# Patient Record
Sex: FEMALE | Race: WHITE | NOT HISPANIC OR LATINO
[De-identification: names, ages, dates, MRNs, and addresses within clinical notes are randomized per-mention and may not be internally consistent; named-entity substitution may affect disease eponyms.]

---

## 2019-05-20 PROBLEM — Z00.00 ENCOUNTER FOR PREVENTIVE HEALTH EXAMINATION: Status: ACTIVE | Noted: 2019-05-20

## 2019-06-19 ENCOUNTER — APPOINTMENT (OUTPATIENT)
Dept: CARDIOLOGY | Facility: CLINIC | Age: 84
End: 2019-06-19
Payer: MEDICARE

## 2019-06-19 ENCOUNTER — NON-APPOINTMENT (OUTPATIENT)
Age: 84
End: 2019-06-19

## 2019-06-19 VITALS
HEIGHT: 62 IN | BODY MASS INDEX: 31.28 KG/M2 | OXYGEN SATURATION: 95 % | SYSTOLIC BLOOD PRESSURE: 160 MMHG | WEIGHT: 170 LBS | DIASTOLIC BLOOD PRESSURE: 80 MMHG | HEART RATE: 64 BPM

## 2019-06-19 DIAGNOSIS — Z82.49 FAMILY HISTORY OF ISCHEMIC HEART DISEASE AND OTHER DISEASES OF THE CIRCULATORY SYSTEM: ICD-10-CM

## 2019-06-19 DIAGNOSIS — I34.0 NONRHEUMATIC MITRAL (VALVE) INSUFFICIENCY: ICD-10-CM

## 2019-06-19 DIAGNOSIS — R06.02 SHORTNESS OF BREATH: ICD-10-CM

## 2019-06-19 DIAGNOSIS — E03.9 HYPOTHYROIDISM, UNSPECIFIED: ICD-10-CM

## 2019-06-19 DIAGNOSIS — Z87.891 PERSONAL HISTORY OF NICOTINE DEPENDENCE: ICD-10-CM

## 2019-06-19 DIAGNOSIS — I34.2 NONRHEUMATIC MITRAL (VALVE) STENOSIS: ICD-10-CM

## 2019-06-19 PROCEDURE — 93000 ELECTROCARDIOGRAM COMPLETE: CPT

## 2019-06-19 PROCEDURE — 99204 OFFICE O/P NEW MOD 45 MIN: CPT

## 2019-06-19 RX ORDER — HYDROCHLOROTHIAZIDE 25 MG/1
25 TABLET ORAL
Qty: 90 | Refills: 0 | Status: ACTIVE | COMMUNITY
Start: 2018-07-05

## 2019-06-19 RX ORDER — APIXABAN 5 MG/1
5 TABLET, FILM COATED ORAL
Qty: 180 | Refills: 0 | Status: ACTIVE | COMMUNITY
Start: 2018-06-20

## 2019-06-19 RX ORDER — AMIODARONE HYDROCHLORIDE 100 MG/1
100 TABLET ORAL DAILY
Refills: 0 | Status: ACTIVE | COMMUNITY

## 2019-06-19 RX ORDER — ATORVASTATIN CALCIUM 20 MG/1
20 TABLET, FILM COATED ORAL DAILY
Refills: 0 | Status: ACTIVE | COMMUNITY

## 2019-06-19 RX ORDER — NITROGLYCERIN 0.4 MG/1
0.4 TABLET SUBLINGUAL
Qty: 25 | Refills: 0 | Status: ACTIVE | COMMUNITY
Start: 2018-06-20

## 2019-06-19 RX ORDER — LEVOTHYROXINE SODIUM 0.07 MG/1
75 TABLET ORAL
Qty: 90 | Refills: 0 | Status: ACTIVE | COMMUNITY
Start: 2019-02-11

## 2019-06-19 RX ORDER — ENALAPRIL MALEATE 20 MG/1
20 TABLET ORAL DAILY
Refills: 0 | Status: ACTIVE | COMMUNITY

## 2019-06-19 RX ORDER — AMLODIPINE BESYLATE 5 MG/1
5 TABLET ORAL
Qty: 90 | Refills: 0 | Status: ACTIVE | COMMUNITY
Start: 2019-02-20

## 2019-06-19 NOTE — REASON FOR VISIT
[Consultation] : a consultation regarding [Aortic Stenosis] : aortic stenosis [Atrial Fibrillation] : atrial fibrillation

## 2019-06-25 NOTE — ASSESSMENT
[FreeTextEntry1] : \par Coronary artery disease, atrial fibrillation, valvular heart disease. \par \par Follow up echocardiogram\par \par Continue amiodarone.  Discussed need for followup ophthalmology evaluation. PFTs LFTs and TFTs \par Continue anticoagulation monitor hemoglobin and renal function\par Continue antihypertensive therapy with enalapril, hydrochlorothiazide, and amlodipine. \par Continue statin therapy. \par Monitor thyroid replacement. \par \par Requested prior echocardiogram and cardiac catheterization results. \par \par Clinical followup will be scheduled after completion of echocardiogram and review of outside records\par

## 2019-06-25 NOTE — PHYSICAL EXAM
[General Appearance - Well Developed] : well developed [Normal Appearance] : normal appearance [Well Groomed] : well groomed [General Appearance - Well Nourished] : well nourished [No Deformities] : no deformities [Normal Conjunctiva] : the conjunctiva exhibited no abnormalities [General Appearance - In No Acute Distress] : no acute distress [Eyelids - No Xanthelasma] : the eyelids demonstrated no xanthelasmas [Normal Oral Mucosa] : normal oral mucosa [No Oral Cyanosis] : no oral cyanosis [No Oral Pallor] : no oral pallor [No Jugular Venous Leahy A Waves] : no jugular venous leahy A waves [Normal Jugular Venous V Waves Present] : normal jugular venous V waves present [Normal Jugular Venous A Waves Present] : normal jugular venous A waves present [Heart Sounds] : normal S1 and S2 [Heart Rate And Rhythm] : heart rate and rhythm were normal [Exaggerated Use Of Accessory Muscles For Inspiration] : no accessory muscle use [Respiration, Rhythm And Depth] : normal respiratory rhythm and effort [Auscultation Breath Sounds / Voice Sounds] : lungs were clear to auscultation bilaterally [Abdomen Soft] : soft [Abdomen Tenderness] : non-tender [Abnormal Walk] : normal gait [Abdomen Mass (___ Cm)] : no abdominal mass palpated [Cyanosis, Localized] : no localized cyanosis [Gait - Sufficient For Exercise Testing] : the gait was sufficient for exercise testing [Nail Clubbing] : no clubbing of the fingernails [Petechial Hemorrhages (___cm)] : no petechial hemorrhages [Skin Color & Pigmentation] : normal skin color and pigmentation [Skin Lesions] : no skin lesions [No Venous Stasis] : no venous stasis [] : no rash [No Skin Ulcers] : no skin ulcer [No Xanthoma] : no  xanthoma was observed [Oriented To Time, Place, And Person] : oriented to person, place, and time [Mood] : the mood was normal [Affect] : the affect was normal [No Anxiety] : not feeling anxious [FreeTextEntry1] : VINAY AT BASE

## 2019-06-25 NOTE — HISTORY OF PRESENT ILLNESS
[FreeTextEntry1] : HENRIK COLLINS  is a 86 year old  F\par \par Self-referred to establish local cardiovascular care, prior cardiovascular evaluation with Dr. Miramontes\par h/o CAD, PAF, VHD\par Prior episode of chest discomfort and had angiogram, where she was told there were blockages. No revascularization was performed. \par There is a history of atrial fibrillation. Previously. She is on Coumadin now is on Elliquis. There was a recurrent episode for which amiodarone was restarted. \par Past medical history includes hypertension, hyperlipidemia, and hypothyroid\par There is no prior history of a clinical myocardial infarction\par There is no history of symptomatic congestive heart failure \par \par Physically active at the gym and exercises up to 30 minutes \par Previously participated in cardiac rehabilitation \par She reports symptoms of shortness of breath intermittently. \par There is no exertional chest pain, pressure or discomfort. \par There is no orthopnea. \par There are no symptomatic palpitations, lightheadedness, dizziness or syncope.\par She reports that she would not want any further procedures done. \par \par Lives full time in [place]\par \par Prior blood work, November 2018, creatinine 0.9, total cholesterol 193, , TSH 2.2, hemoglobin 13.4.

## 2019-06-25 NOTE — ADDENDUM
[FreeTextEntry1] : Last note reviewed from Kulwinder Whitlock.  History of coronary artery disease, atria fibrillation, VHD, hypertension, hyperlipidemia. Remote angiography demonstrated LAD and RCA disease. No revascularization. Prior treatment of atrial fibrillation with multaq which was changed to amiodarone. Last echocardiogram demonstrates normal left ventricular function moderate aortic stenosis. Mild mitral regurgitation, mild pulmonary hypertension

## 2019-07-03 ENCOUNTER — APPOINTMENT (OUTPATIENT)
Dept: CARDIOLOGY | Facility: CLINIC | Age: 84
End: 2019-07-03
Payer: MEDICARE

## 2019-07-03 PROCEDURE — 93306 TTE W/DOPPLER COMPLETE: CPT

## 2019-07-11 ENCOUNTER — APPOINTMENT (OUTPATIENT)
Dept: CARDIOLOGY | Facility: CLINIC | Age: 84
End: 2019-07-11
Payer: MEDICARE

## 2019-07-11 VITALS
OXYGEN SATURATION: 96 % | BODY MASS INDEX: 32.02 KG/M2 | SYSTOLIC BLOOD PRESSURE: 148 MMHG | DIASTOLIC BLOOD PRESSURE: 70 MMHG | WEIGHT: 174 LBS | HEART RATE: 83 BPM | HEIGHT: 62 IN

## 2019-07-11 DIAGNOSIS — I35.0 NONRHEUMATIC AORTIC (VALVE) STENOSIS: ICD-10-CM

## 2019-07-11 DIAGNOSIS — E78.00 PURE HYPERCHOLESTEROLEMIA, UNSPECIFIED: ICD-10-CM

## 2019-07-11 DIAGNOSIS — I25.10 ATHEROSCLEROTIC HEART DISEASE OF NATIVE CORONARY ARTERY W/OUT ANGINA PECTORIS: ICD-10-CM

## 2019-07-11 PROCEDURE — 99214 OFFICE O/P EST MOD 30 MIN: CPT

## 2019-07-11 NOTE — ASSESSMENT
[FreeTextEntry1] : \par Coronary artery disease, atrial fibrillation, valvular heart disease. \par \par Patient with good exercise capacity (exercises 30 minutes on stationary bike 3 days a week without abnormal dyspnea or angina). \par \par Follow up echocardiogram demonstrates normal LV systolic function with severe calcific AS. \par \par Continue amiodarone.  Discussed need for followup ophthalmology evaluation. PFTs LFTs and TFTs \par Continue anticoagulation monitor hemoglobin and renal function\par Continue antihypertensive therapy with enalapril, hydrochlorothiazide, and amlodipine. \par Continue statin therapy. \par Monitor thyroid replacement. \par Will refer to [facility] for evaluation of her AS. Not keen on having any invasive procedures, however willing to setup consultations for discussion.\par \par \par

## 2019-07-11 NOTE — HISTORY OF PRESENT ILLNESS
[FreeTextEntry1] : HENRIK COLLINS  is a 86 year old  F\par \par Self-referred to establish local cardiovascular care, prior cardiovascular evaluation with Dr. Miramontes\par h/o CAD, PAF, VHD\par Prior episode of chest discomfort and had angiogram, where she was told there were blockages. No revascularization was performed. \par There is a history of atrial fibrillation. Previously. She was on Coumadin now is on Elliquis. There was a recurrent episode for which amiodarone was restarted. \par Past medical history includes hypertension, hyperlipidemia, and hypothyroid\par There is no prior history of a clinical myocardial infarction\par There is no history of symptomatic congestive heart failure \par \par Physically active at the gym and exercises up to 30 minutes \par Previously participated in cardiac rehabilitation \par She reports symptoms of shortness of breath intermittently. \par There is no exertional chest pain, pressure or discomfort. \par There is no orthopnea. \par There are no symptomatic palpitations, lightheadedness, dizziness or syncope.\par She reports that she would not want any further procedures done. \par \par Lives full time in [place]\par \par Prior blood work, November 2018, creatinine 0.9, total cholesterol 193, , TSH 2.2, hemoglobin 13.4.

## 2019-07-11 NOTE — PHYSICAL EXAM
[Normal Appearance] : normal appearance [General Appearance - Well Developed] : well developed [Well Groomed] : well groomed [No Deformities] : no deformities [General Appearance - Well Nourished] : well nourished [General Appearance - In No Acute Distress] : no acute distress [Normal Conjunctiva] : the conjunctiva exhibited no abnormalities [Eyelids - No Xanthelasma] : the eyelids demonstrated no xanthelasmas [No Oral Pallor] : no oral pallor [Normal Oral Mucosa] : normal oral mucosa [No Oral Cyanosis] : no oral cyanosis [Normal Jugular Venous V Waves Present] : normal jugular venous V waves present [Normal Jugular Venous A Waves Present] : normal jugular venous A waves present [No Jugular Venous Leahy A Waves] : no jugular venous leahy A waves [Auscultation Breath Sounds / Voice Sounds] : lungs were clear to auscultation bilaterally [Exaggerated Use Of Accessory Muscles For Inspiration] : no accessory muscle use [Respiration, Rhythm And Depth] : normal respiratory rhythm and effort [Heart Rate And Rhythm] : heart rate and rhythm were normal [Heart Sounds] : normal S1 and S2 [FreeTextEntry1] : VINAY AT BASE [Abdomen Mass (___ Cm)] : no abdominal mass palpated [Abnormal Walk] : normal gait [Gait - Sufficient For Exercise Testing] : the gait was sufficient for exercise testing [Nail Clubbing] : no clubbing of the fingernails [Cyanosis, Localized] : no localized cyanosis [Petechial Hemorrhages (___cm)] : no petechial hemorrhages [Skin Color & Pigmentation] : normal skin color and pigmentation [] : no rash [Skin Lesions] : no skin lesions [No Venous Stasis] : no venous stasis [No Skin Ulcers] : no skin ulcer [No Xanthoma] : no  xanthoma was observed [Affect] : the affect was normal [Oriented To Time, Place, And Person] : oriented to person, place, and time [No Anxiety] : not feeling anxious [Mood] : the mood was normal

## 2019-07-16 ENCOUNTER — APPOINTMENT (OUTPATIENT)
Dept: CARDIOLOGY | Facility: CLINIC | Age: 84
End: 2019-07-16

## 2019-08-26 ENCOUNTER — APPOINTMENT (OUTPATIENT)
Dept: CARDIOTHORACIC SURGERY | Facility: CLINIC | Age: 84
End: 2019-08-26
Payer: MEDICARE

## 2019-09-02 PROBLEM — I34.0 NON-RHEUMATIC MITRAL REGURGITATION: Status: ACTIVE | Noted: 2019-06-19

## 2019-09-09 ENCOUNTER — RESULT REVIEW (OUTPATIENT)
Age: 84
End: 2019-09-09

## 2019-09-23 ENCOUNTER — APPOINTMENT (OUTPATIENT)
Dept: CARDIOTHORACIC SURGERY | Facility: CLINIC | Age: 84
End: 2019-09-23
Payer: MEDICARE

## 2019-09-23 VITALS
BODY MASS INDEX: 31.09 KG/M2 | DIASTOLIC BLOOD PRESSURE: 80 MMHG | SYSTOLIC BLOOD PRESSURE: 150 MMHG | OXYGEN SATURATION: 93 % | HEART RATE: 79 BPM | WEIGHT: 170 LBS

## 2019-09-23 PROCEDURE — 99205 OFFICE O/P NEW HI 60 MIN: CPT

## 2019-09-23 NOTE — HISTORY OF PRESENT ILLNESS
[FreeTextEntry1] : Ms. COLLINS is a 86 year old female referred by Dr. Murdock who presents for consultation. She is here for evaluation of aortic valve disease.  Her past medical history includes PAF (Eliquis), HTN, HLD, and hypothyroid.\par \par Echocardiogram images obtained at [facility] on 07/03/19 demonstrate LVEF 60%, mild mitral regurgitation, calcified aortic valve PAVG 60 mmHg, MAVG 28 mmHg, BARRY 0.8 cm2 severe aortic stenosis with severely dilated left atrium.\par \par \par

## 2019-09-23 NOTE — CONSULT LETTER
[FreeTextEntry2] : James Murdock MD [FreeTextEntry3] : Ashok Mccallum MD\par  of Cardiothoracic Surgery\par [facility]\par [address] [place] [address] \[place] [address]\par [phone]\par

## 2019-09-23 NOTE — PHYSICAL EXAM
[General Appearance - Alert] : alert [Sclera] : the sclera and conjunctiva were normal [General Appearance - In No Acute Distress] : in no acute distress [Outer Ear] : the ears and nose were normal in appearance [Neck Appearance] : the appearance of the neck was normal [Exaggerated Use Of Accessory Muscles For Inspiration] : no accessory muscle use [Apical Impulse] : the apical impulse was normal [Heart Sounds] : normal S1 and S2 [FreeTextEntry1] : III/VI systolic murmur at the right second intercostal space [Examination Of The Chest] : the chest was normal in appearance [Arterial Pulses Carotid] : carotid pulses were normal with no bruits [Bowel Sounds] : normal bowel sounds [Abdomen Soft] : soft [External Female Genitalia] : normal external genitalia [Cervical Lymph Nodes Enlarged Posterior Bilaterally] : posterior cervical [No CVA Tenderness] : no ~M costovertebral angle tenderness [Abnormal Walk] : normal gait [Nail Clubbing] : no clubbing  or cyanosis of the fingernails [Skin Color & Pigmentation] : normal skin color and pigmentation [] : no rash [Cranial Nerves] : cranial nerves 2-12 were intact [Sensation] : the sensory exam was normal to light touch and pinprick [Oriented To Time, Place, And Person] : oriented to person, place, and time [Affect] : the affect was normal

## 2019-09-23 NOTE — ASSESSMENT
[FreeTextEntry1] : Ms. Mckeon is an 86 year old female with severe aortic stenosis, NYHA class II. She will need an angiogram, CTA TAVR protocol and TTE and will see me again in the offcie for follow up. \par \par \par I thank you for the opportunity to participate in the care of your patients. Please do not hesitate to contact me should you have any questions.\par

## 2019-10-03 ENCOUNTER — OUTPATIENT (OUTPATIENT)
Dept: OUTPATIENT SERVICES | Facility: HOSPITAL | Age: 84
LOS: 1 days | End: 2019-10-03
Payer: MEDICARE

## 2019-10-03 PROCEDURE — 93454 CORONARY ARTERY ANGIO S&I: CPT | Mod: 26

## 2019-10-03 PROCEDURE — 93010 ELECTROCARDIOGRAM REPORT: CPT

## 2019-10-04 ENCOUNTER — APPOINTMENT (OUTPATIENT)
Dept: CARDIOLOGY | Facility: CLINIC | Age: 84
End: 2019-10-04
Payer: MEDICARE

## 2019-10-04 VITALS
SYSTOLIC BLOOD PRESSURE: 158 MMHG | DIASTOLIC BLOOD PRESSURE: 66 MMHG | OXYGEN SATURATION: 97 % | HEIGHT: 62 IN | BODY MASS INDEX: 31.28 KG/M2 | HEART RATE: 76 BPM | WEIGHT: 170 LBS

## 2019-10-04 DIAGNOSIS — I10 ESSENTIAL (PRIMARY) HYPERTENSION: ICD-10-CM

## 2019-10-04 DIAGNOSIS — I48.91 UNSPECIFIED ATRIAL FIBRILLATION: ICD-10-CM

## 2019-10-04 DIAGNOSIS — M79.669 PAIN IN UNSPECIFIED LOWER LEG: ICD-10-CM

## 2019-10-04 PROCEDURE — 99214 OFFICE O/P EST MOD 30 MIN: CPT

## 2019-10-04 NOTE — PHYSICAL EXAM
[General Appearance - Well Developed] : well developed [Normal Appearance] : normal appearance [Well Groomed] : well groomed [General Appearance - Well Nourished] : well nourished [General Appearance - In No Acute Distress] : no acute distress [No Deformities] : no deformities [Normal Conjunctiva] : the conjunctiva exhibited no abnormalities [Eyelids - No Xanthelasma] : the eyelids demonstrated no xanthelasmas [No Oral Pallor] : no oral pallor [No Oral Cyanosis] : no oral cyanosis [Normal Oral Mucosa] : normal oral mucosa [Normal Jugular Venous A Waves Present] : normal jugular venous A waves present [Normal Jugular Venous V Waves Present] : normal jugular venous V waves present [No Jugular Venous Leahy A Waves] : no jugular venous leahy A waves [] : no respiratory distress [Exaggerated Use Of Accessory Muscles For Inspiration] : no accessory muscle use [Respiration, Rhythm And Depth] : normal respiratory rhythm and effort [Auscultation Breath Sounds / Voice Sounds] : lungs were clear to auscultation bilaterally [Heart Rate And Rhythm] : heart rate and rhythm were normal [Murmurs] : no murmurs present [Heart Sounds] : normal S1 and S2

## 2019-10-04 NOTE — HISTORY OF PRESENT ILLNESS
[FreeTextEntry1] : CAD: The patient is scheduled for atherectomy and PCI of the LAD.\par \par \par Atrial Fibrillation: The patient is tolerating Eliquis.  Patient advised to discontinue this drug 48 hours prior to PCI.\par \par Femoral Pain:  will obtain Art Duplex RFA.\par \par HTN:  likely related to pain, will hold on change in rx.\par \par ASA:  will start ASA 81 q day if RFA duplex negative.\par \par AS:  Planned for TAVR after PCI.

## 2019-10-14 ENCOUNTER — APPOINTMENT (OUTPATIENT)
Dept: CARDIOLOGY | Facility: CLINIC | Age: 84
End: 2019-10-14

## 2019-10-18 ENCOUNTER — APPOINTMENT (OUTPATIENT)
Dept: CARDIOLOGY | Facility: CLINIC | Age: 84
End: 2019-10-18

## 2019-10-24 ENCOUNTER — APPOINTMENT (OUTPATIENT)
Dept: CARDIOLOGY | Facility: CLINIC | Age: 84
End: 2019-10-24

## 2023-04-06 ENCOUNTER — OFFICE (OUTPATIENT)
Dept: URBAN - METROPOLITAN AREA CLINIC 8 | Facility: CLINIC | Age: 88
Setting detail: OPHTHALMOLOGY
End: 2023-04-06
Payer: COMMERCIAL

## 2023-04-06 ENCOUNTER — RX ONLY (RX ONLY)
Age: 88
End: 2023-04-06

## 2023-04-06 DIAGNOSIS — H02.834: ICD-10-CM

## 2023-04-06 DIAGNOSIS — H35.3121: ICD-10-CM

## 2023-04-06 DIAGNOSIS — Z96.1: ICD-10-CM

## 2023-04-06 DIAGNOSIS — H02.831: ICD-10-CM

## 2023-04-06 DIAGNOSIS — H35.3211: ICD-10-CM

## 2023-04-06 PROCEDURE — 92134 CPTRZ OPH DX IMG PST SGM RTA: CPT | Performed by: OPHTHALMOLOGY

## 2023-04-06 PROCEDURE — 99214 OFFICE O/P EST MOD 30 MIN: CPT | Performed by: OPHTHALMOLOGY

## 2023-04-06 ASSESSMENT — REFRACTION_CURRENTRX
OD_SPHERE: -0.50
OS_AXIS: 000
OD_AXIS: 078
OD_SPHERE: +0.50
OD_CYLINDER: -1.25
OD_ADD: +2.75
OS_ADD: +300.00
OD_VPRISM_DIRECTION: BF
OS_AXIS: 096
OD_VPRISM_DIRECTION: BF
OD_OVR_VA: 20/
OS_CYLINDER: 0.00
OS_CYLINDER: -0.50
OS_VPRISM_DIRECTION: BF
OS_SPHERE: PLANO
OS_OVR_VA: 20/
OD_CYLINDER: SPHERE
OD_ADD: +3.00
OS_SPHERE: PLANO
OD_OVR_VA: 20/
OS_VPRISM_DIRECTION: BF
OS_ADD: +2.75
OS_OVR_VA: 20/

## 2023-04-06 ASSESSMENT — AXIALLENGTH_DERIVED
OD_AL: 22.6682
OS_AL: 22.6182
OD_AL: 22.7132
OD_AL: 22.7584

## 2023-04-06 ASSESSMENT — REFRACTION_AUTOREFRACTION
OS_AXIS: 093
OS_SPHERE: +1.00
OS_CYLINDER: -1.50
OD_CYLINDER: -1.75
OD_AXIS: 090
OD_SPHERE: +0.50

## 2023-04-06 ASSESSMENT — REFRACTION_MANIFEST
OD_CYLINDER: -1.50
OD_SPHERE: +0.50
OD_ADD: +3.00
OD_ADD: +3.00
OD_VA1: 20/20
OS_CYLINDER: -0.50
OU_VA: 20/20-2
OS_VA1: 20/25-1
OS_CYLINDER: -1.25
OS_AXIS: 100
OS_SPHERE: PLANO
OD_AXIS: 085
OS_SPHERE: PLANO
OD_VA1: 20/25
OD_VA2: 20/20(J1+)
OD_CYLINDER: -1.25
OS_ADD: +3.00
OS_ADD: +3.00
OD_SPHERE: +0.50
OS_AXIS: 095
OS_VA1: 20/20
OD_AXIS: 090
OS_VA2: 20/20(J1+)

## 2023-04-06 ASSESSMENT — KERATOMETRY
OD_K2POWER_DIOPTERS: 46.50
OS_AXISANGLE_DEGREES: 010
OS_K1POWER_DIOPTERS: 45.75
METHOD_AUTO_MANUAL: AUTO
OS_K2POWER_DIOPTERS: 46.25
OD_AXISANGLE_DEGREES: 175
OD_K1POWER_DIOPTERS: 46.00

## 2023-04-06 ASSESSMENT — TONOMETRY
OD_IOP_MMHG: 21
OS_IOP_MMHG: 21

## 2023-04-06 ASSESSMENT — SPHEQUIV_DERIVED
OD_SPHEQUIV: -0.125
OD_SPHEQUIV: -0.25
OS_SPHEQUIV: 0.25
OD_SPHEQUIV: -0.375

## 2023-04-06 ASSESSMENT — VISUAL ACUITY
OD_BCVA: 20/30-1
OS_BCVA: 20/30-1

## 2023-04-06 ASSESSMENT — CONFRONTATIONAL VISUAL FIELD TEST (CVF)
OS_FINDINGS: FULL
OD_FINDINGS: FULL

## 2023-04-06 ASSESSMENT — LID POSITION - DERMATOCHALASIS
OD_DERMATOCHALASIS: 2+ 3+
OS_DERMATOCHALASIS: 2+ 3+

## 2023-04-19 ENCOUNTER — OFFICE (OUTPATIENT)
Dept: URBAN - METROPOLITAN AREA CLINIC 38 | Facility: CLINIC | Age: 88
Setting detail: OPHTHALMOLOGY
End: 2023-04-19
Payer: COMMERCIAL

## 2023-04-19 DIAGNOSIS — H35.3114: ICD-10-CM

## 2023-04-19 DIAGNOSIS — H35.3122: ICD-10-CM

## 2023-04-19 DIAGNOSIS — H43.393: ICD-10-CM

## 2023-04-19 PROCEDURE — 67028 INJECTION EYE DRUG: CPT | Performed by: SPECIALIST

## 2023-04-19 PROCEDURE — 99213 OFFICE O/P EST LOW 20 MIN: CPT | Performed by: SPECIALIST

## 2023-04-19 PROCEDURE — 92235 FLUORESCEIN ANGRPH MLTIFRAME: CPT | Performed by: SPECIALIST

## 2023-04-19 ASSESSMENT — REFRACTION_CURRENTRX
OS_CYLINDER: -0.50
OD_OVR_VA: 20/
OS_AXIS: 096
OS_ADD: +2.75
OS_CYLINDER: 0.00
OD_ADD: +3.00
OD_VPRISM_DIRECTION: BF
OS_SPHERE: PLANO
OD_CYLINDER: SPHERE
OS_VPRISM_DIRECTION: BF
OD_CYLINDER: -1.25
OS_OVR_VA: 20/
OD_OVR_VA: 20/
OS_OVR_VA: 20/
OD_SPHERE: +0.50
OS_ADD: +300.00
OD_ADD: +2.75
OD_SPHERE: -0.50
OD_AXIS: 078
OS_VPRISM_DIRECTION: BF
OD_VPRISM_DIRECTION: BF
OS_AXIS: 000
OS_SPHERE: PLANO

## 2023-04-19 ASSESSMENT — CONFRONTATIONAL VISUAL FIELD TEST (CVF)
OS_FINDINGS: FULL
OD_FINDINGS: FULL

## 2023-04-19 ASSESSMENT — LID POSITION - DERMATOCHALASIS
OS_DERMATOCHALASIS: 2+ 3+
OD_DERMATOCHALASIS: 2+ 3+

## 2023-04-26 ASSESSMENT — REFRACTION_AUTOREFRACTION
OS_AXIS: 093
OD_AXIS: 090
OD_SPHERE: +0.50
OS_CYLINDER: -1.50
OD_CYLINDER: -1.75
OS_SPHERE: +1.00

## 2023-04-26 ASSESSMENT — VISUAL ACUITY
OD_BCVA: 20/30+2
OS_BCVA: 20/30-1

## 2023-04-26 ASSESSMENT — KERATOMETRY
OS_K1POWER_DIOPTERS: 45.75
OS_K2POWER_DIOPTERS: 46.25
OD_K1POWER_DIOPTERS: 46.00
OD_AXISANGLE_DEGREES: 175
METHOD_AUTO_MANUAL: AUTO
OS_AXISANGLE_DEGREES: 010
OD_K2POWER_DIOPTERS: 46.50

## 2023-04-26 ASSESSMENT — SPHEQUIV_DERIVED
OD_SPHEQUIV: -0.375
OS_SPHEQUIV: 0.25

## 2023-04-26 ASSESSMENT — AXIALLENGTH_DERIVED
OD_AL: 22.7584
OS_AL: 22.6182

## 2023-05-17 ENCOUNTER — OFFICE (OUTPATIENT)
Dept: URBAN - METROPOLITAN AREA CLINIC 38 | Facility: CLINIC | Age: 88
Setting detail: OPHTHALMOLOGY
End: 2023-05-17
Payer: COMMERCIAL

## 2023-05-17 DIAGNOSIS — H35.3114: ICD-10-CM

## 2023-05-17 DIAGNOSIS — H35.3122: ICD-10-CM

## 2023-05-17 DIAGNOSIS — H43.393: ICD-10-CM

## 2023-05-17 PROCEDURE — 99213 OFFICE O/P EST LOW 20 MIN: CPT | Performed by: SPECIALIST

## 2023-05-17 PROCEDURE — 92134 CPTRZ OPH DX IMG PST SGM RTA: CPT | Performed by: SPECIALIST

## 2023-05-17 ASSESSMENT — AXIALLENGTH_DERIVED
OD_AL: 22.7584
OS_AL: 22.6182

## 2023-05-17 ASSESSMENT — REFRACTION_AUTOREFRACTION
OD_SPHERE: +0.50
OS_AXIS: 093
OD_CYLINDER: -1.75
OS_SPHERE: +1.00
OD_AXIS: 090
OS_CYLINDER: -1.50

## 2023-05-17 ASSESSMENT — LID POSITION - DERMATOCHALASIS
OD_DERMATOCHALASIS: 2+ 3+
OS_DERMATOCHALASIS: 2+ 3+

## 2023-05-17 ASSESSMENT — KERATOMETRY
OS_AXISANGLE_DEGREES: 010
METHOD_AUTO_MANUAL: AUTO
OD_AXISANGLE_DEGREES: 175
OS_K2POWER_DIOPTERS: 46.25
OD_K1POWER_DIOPTERS: 46.00
OS_K1POWER_DIOPTERS: 45.75
OD_K2POWER_DIOPTERS: 46.50

## 2023-05-17 ASSESSMENT — TONOMETRY
OD_IOP_MMHG: 20
OS_IOP_MMHG: 20

## 2023-05-17 ASSESSMENT — VISUAL ACUITY
OS_BCVA: 20/50-1
OD_BCVA: 20/30-1

## 2023-05-17 ASSESSMENT — SPHEQUIV_DERIVED
OS_SPHEQUIV: 0.25
OD_SPHEQUIV: -0.375

## 2023-05-17 ASSESSMENT — CONFRONTATIONAL VISUAL FIELD TEST (CVF)
OS_FINDINGS: FULL
OD_FINDINGS: FULL

## 2023-06-07 ENCOUNTER — OFFICE (OUTPATIENT)
Dept: URBAN - METROPOLITAN AREA CLINIC 38 | Facility: CLINIC | Age: 88
Setting detail: OPHTHALMOLOGY
End: 2023-06-07
Payer: COMMERCIAL

## 2023-06-07 DIAGNOSIS — H43.393: ICD-10-CM

## 2023-06-07 DIAGNOSIS — H35.3122: ICD-10-CM

## 2023-06-07 DIAGNOSIS — H35.3114: ICD-10-CM

## 2023-06-07 PROCEDURE — 99213 OFFICE O/P EST LOW 20 MIN: CPT | Performed by: SPECIALIST

## 2023-06-07 PROCEDURE — 92134 CPTRZ OPH DX IMG PST SGM RTA: CPT | Performed by: SPECIALIST

## 2023-06-07 ASSESSMENT — TONOMETRY
OD_IOP_MMHG: 19
OS_IOP_MMHG: 18

## 2023-06-07 ASSESSMENT — AXIALLENGTH_DERIVED
OD_AL: 22.7584
OS_AL: 22.6182

## 2023-06-07 ASSESSMENT — REFRACTION_AUTOREFRACTION
OS_AXIS: 093
OS_SPHERE: +1.00
OD_SPHERE: +0.50
OD_CYLINDER: -1.75
OS_CYLINDER: -1.50
OD_AXIS: 090

## 2023-06-07 ASSESSMENT — CONFRONTATIONAL VISUAL FIELD TEST (CVF)
OS_FINDINGS: FULL
OD_FINDINGS: FULL

## 2023-06-07 ASSESSMENT — KERATOMETRY
OS_K1POWER_DIOPTERS: 45.75
OD_K1POWER_DIOPTERS: 46.00
METHOD_AUTO_MANUAL: AUTO
OD_K2POWER_DIOPTERS: 46.50
OS_AXISANGLE_DEGREES: 010
OD_AXISANGLE_DEGREES: 175
OS_K2POWER_DIOPTERS: 46.25

## 2023-06-07 ASSESSMENT — SPHEQUIV_DERIVED
OD_SPHEQUIV: -0.375
OS_SPHEQUIV: 0.25

## 2023-06-07 ASSESSMENT — VISUAL ACUITY
OS_BCVA: 20/40-
OD_BCVA: 20/25-

## 2023-07-19 ENCOUNTER — OFFICE (OUTPATIENT)
Dept: URBAN - METROPOLITAN AREA CLINIC 38 | Facility: CLINIC | Age: 88
Setting detail: OPHTHALMOLOGY
End: 2023-07-19
Payer: COMMERCIAL

## 2023-07-19 DIAGNOSIS — H43.393: ICD-10-CM

## 2023-07-19 DIAGNOSIS — H35.3114: ICD-10-CM

## 2023-07-19 PROCEDURE — 99213 OFFICE O/P EST LOW 20 MIN: CPT | Performed by: SPECIALIST

## 2023-07-19 PROCEDURE — 92134 CPTRZ OPH DX IMG PST SGM RTA: CPT | Performed by: SPECIALIST

## 2023-07-19 PROCEDURE — 67028 INJECTION EYE DRUG: CPT | Performed by: SPECIALIST

## 2023-07-19 ASSESSMENT — KERATOMETRY
OD_K1POWER_DIOPTERS: 46.00
OD_K2POWER_DIOPTERS: 46.50
METHOD_AUTO_MANUAL: AUTO
OS_K2POWER_DIOPTERS: 46.25
OS_K1POWER_DIOPTERS: 45.75
OS_AXISANGLE_DEGREES: 010
OD_AXISANGLE_DEGREES: 175

## 2023-07-19 ASSESSMENT — TONOMETRY
OD_IOP_MMHG: 16
OS_IOP_MMHG: 18

## 2023-07-19 ASSESSMENT — SPHEQUIV_DERIVED
OD_SPHEQUIV: -0.375
OS_SPHEQUIV: 0.25

## 2023-07-19 ASSESSMENT — CONFRONTATIONAL VISUAL FIELD TEST (CVF)
OD_FINDINGS: FULL
OS_FINDINGS: FULL

## 2023-07-19 ASSESSMENT — REFRACTION_AUTOREFRACTION
OD_CYLINDER: -1.75
OS_CYLINDER: -1.50
OS_AXIS: 093
OD_SPHERE: +0.50
OD_AXIS: 090
OS_SPHERE: +1.00

## 2023-07-19 ASSESSMENT — AXIALLENGTH_DERIVED
OD_AL: 22.7584
OS_AL: 22.6182

## 2023-07-19 ASSESSMENT — VISUAL ACUITY
OD_BCVA: 20/30
OS_BCVA: 20/30-2

## 2024-09-19 ENCOUNTER — OFFICE (OUTPATIENT)
Dept: URBAN - METROPOLITAN AREA CLINIC 8 | Facility: CLINIC | Age: 89
Setting detail: OPHTHALMOLOGY
End: 2024-09-19
Payer: COMMERCIAL

## 2024-09-19 DIAGNOSIS — H35.3122: ICD-10-CM

## 2024-09-19 DIAGNOSIS — H52.4: ICD-10-CM

## 2024-09-19 DIAGNOSIS — H35.3114: ICD-10-CM

## 2024-09-19 DIAGNOSIS — H43.393: ICD-10-CM

## 2024-09-19 PROBLEM — H35.40 PERIPAPILLARY ATROPHY ; BOTH EYES: Status: ACTIVE | Noted: 2024-09-19

## 2024-09-19 PROCEDURE — 92015 DETERMINE REFRACTIVE STATE: CPT

## 2024-09-19 PROCEDURE — 92134 CPTRZ OPH DX IMG PST SGM RTA: CPT

## 2024-09-19 PROCEDURE — 92014 COMPRE OPH EXAM EST PT 1/>: CPT

## 2024-09-19 ASSESSMENT — CONFRONTATIONAL VISUAL FIELD TEST (CVF)
OS_FINDINGS: FULL
OD_FINDINGS: FULL

## 2024-09-19 ASSESSMENT — LID POSITION - DERMATOCHALASIS
OS_DERMATOCHALASIS: 2+ 3+
OD_DERMATOCHALASIS: 2+ 3+